# Patient Record
Sex: MALE | Race: OTHER | HISPANIC OR LATINO | URBAN - METROPOLITAN AREA
[De-identification: names, ages, dates, MRNs, and addresses within clinical notes are randomized per-mention and may not be internally consistent; named-entity substitution may affect disease eponyms.]

---

## 2019-09-10 ENCOUNTER — EMERGENCY (EMERGENCY)
Facility: HOSPITAL | Age: 24
LOS: 1 days | Discharge: HOME | End: 2019-09-10
Attending: EMERGENCY MEDICINE | Admitting: EMERGENCY MEDICINE
Payer: COMMERCIAL

## 2019-09-10 VITALS
WEIGHT: 250 LBS | OXYGEN SATURATION: 97 % | HEART RATE: 84 BPM | TEMPERATURE: 98 F | DIASTOLIC BLOOD PRESSURE: 90 MMHG | RESPIRATION RATE: 18 BRPM | SYSTOLIC BLOOD PRESSURE: 146 MMHG

## 2019-09-10 VITALS
HEART RATE: 85 BPM | DIASTOLIC BLOOD PRESSURE: 72 MMHG | SYSTOLIC BLOOD PRESSURE: 122 MMHG | RESPIRATION RATE: 18 BRPM | OXYGEN SATURATION: 95 %

## 2019-09-10 DIAGNOSIS — R10.13 EPIGASTRIC PAIN: ICD-10-CM

## 2019-09-10 DIAGNOSIS — Z90.49 ACQUIRED ABSENCE OF OTHER SPECIFIED PARTS OF DIGESTIVE TRACT: Chronic | ICD-10-CM

## 2019-09-10 DIAGNOSIS — R11.10 VOMITING, UNSPECIFIED: ICD-10-CM

## 2019-09-10 DIAGNOSIS — Z98.84 BARIATRIC SURGERY STATUS: Chronic | ICD-10-CM

## 2019-09-10 DIAGNOSIS — Z90.49 ACQUIRED ABSENCE OF OTHER SPECIFIED PARTS OF DIGESTIVE TRACT: ICD-10-CM

## 2019-09-10 DIAGNOSIS — R10.9 UNSPECIFIED ABDOMINAL PAIN: ICD-10-CM

## 2019-09-10 LAB
ALBUMIN SERPL ELPH-MCNC: 3.5 G/DL — SIGNIFICANT CHANGE UP (ref 3.4–5)
ALP SERPL-CCNC: 48 U/L — SIGNIFICANT CHANGE UP (ref 40–120)
ALT FLD-CCNC: 18 U/L — SIGNIFICANT CHANGE UP (ref 12–42)
ANION GAP SERPL CALC-SCNC: 6 MMOL/L — LOW (ref 9–16)
APTT BLD: 29.9 SEC — SIGNIFICANT CHANGE UP (ref 27.5–36.3)
AST SERPL-CCNC: 20 U/L — SIGNIFICANT CHANGE UP (ref 15–37)
BASOPHILS NFR BLD AUTO: 0.3 % — SIGNIFICANT CHANGE UP (ref 0–2)
BILIRUB SERPL-MCNC: 0.7 MG/DL — SIGNIFICANT CHANGE UP (ref 0.2–1.2)
BUN SERPL-MCNC: 16 MG/DL — SIGNIFICANT CHANGE UP (ref 7–23)
CALCIUM SERPL-MCNC: 8.9 MG/DL — SIGNIFICANT CHANGE UP (ref 8.5–10.5)
CHLORIDE SERPL-SCNC: 106 MMOL/L — SIGNIFICANT CHANGE UP (ref 96–108)
CO2 SERPL-SCNC: 29 MMOL/L — SIGNIFICANT CHANGE UP (ref 22–31)
CREAT SERPL-MCNC: 1 MG/DL — SIGNIFICANT CHANGE UP (ref 0.5–1.3)
EOSINOPHIL NFR BLD AUTO: 0.7 % — SIGNIFICANT CHANGE UP (ref 0–6)
GLUCOSE SERPL-MCNC: 100 MG/DL — HIGH (ref 70–99)
HCT VFR BLD CALC: 48.3 % — SIGNIFICANT CHANGE UP (ref 39–50)
HGB BLD-MCNC: 16.5 G/DL — SIGNIFICANT CHANGE UP (ref 13–17)
IMM GRANULOCYTES NFR BLD AUTO: 0.4 % — SIGNIFICANT CHANGE UP (ref 0–1.5)
INR BLD: 1.1 — SIGNIFICANT CHANGE UP (ref 0.88–1.16)
LIDOCAIN IGE QN: 77 U/L — SIGNIFICANT CHANGE UP (ref 73–393)
LYMPHOCYTES # BLD AUTO: 20.2 % — SIGNIFICANT CHANGE UP (ref 13–44)
MCHC RBC-ENTMCNC: 30.8 PG — SIGNIFICANT CHANGE UP (ref 27–34)
MCHC RBC-ENTMCNC: 34.2 G/DL — SIGNIFICANT CHANGE UP (ref 32–36)
MCV RBC AUTO: 90.3 FL — SIGNIFICANT CHANGE UP (ref 80–100)
MONOCYTES NFR BLD AUTO: 8.3 % — SIGNIFICANT CHANGE UP (ref 2–14)
NEUTROPHILS NFR BLD AUTO: 70.1 % — SIGNIFICANT CHANGE UP (ref 43–77)
PLATELET # BLD AUTO: 236 K/UL — SIGNIFICANT CHANGE UP (ref 150–400)
POTASSIUM SERPL-MCNC: 3.8 MMOL/L — SIGNIFICANT CHANGE UP (ref 3.5–5.3)
POTASSIUM SERPL-SCNC: 3.8 MMOL/L — SIGNIFICANT CHANGE UP (ref 3.5–5.3)
PROT SERPL-MCNC: 7.4 G/DL — SIGNIFICANT CHANGE UP (ref 6.4–8.2)
PROTHROM AB SERPL-ACNC: 12.2 SEC — SIGNIFICANT CHANGE UP (ref 10–12.9)
RBC # BLD: 5.35 M/UL — SIGNIFICANT CHANGE UP (ref 4.2–5.8)
RBC # FLD: 12.6 % — SIGNIFICANT CHANGE UP (ref 10.3–14.5)
SODIUM SERPL-SCNC: 141 MMOL/L — SIGNIFICANT CHANGE UP (ref 132–145)
WBC # BLD: 9.7 K/UL — SIGNIFICANT CHANGE UP (ref 3.8–10.5)
WBC # FLD AUTO: 9.7 K/UL — SIGNIFICANT CHANGE UP (ref 3.8–10.5)

## 2019-09-10 PROCEDURE — 74177 CT ABD & PELVIS W/CONTRAST: CPT | Mod: 26

## 2019-09-10 PROCEDURE — 99284 EMERGENCY DEPT VISIT MOD MDM: CPT

## 2019-09-10 RX ORDER — ONDANSETRON 8 MG/1
4 TABLET, FILM COATED ORAL ONCE
Refills: 0 | Status: COMPLETED | OUTPATIENT
Start: 2019-09-10 | End: 2019-09-10

## 2019-09-10 RX ORDER — SODIUM CHLORIDE 9 MG/ML
1000 INJECTION INTRAMUSCULAR; INTRAVENOUS; SUBCUTANEOUS ONCE
Refills: 0 | Status: COMPLETED | OUTPATIENT
Start: 2019-09-10 | End: 2019-09-10

## 2019-09-10 RX ORDER — IOHEXOL 300 MG/ML
30 INJECTION, SOLUTION INTRAVENOUS ONCE
Refills: 0 | Status: COMPLETED | OUTPATIENT
Start: 2019-09-10 | End: 2019-09-10

## 2019-09-10 RX ORDER — RANITIDINE HYDROCHLORIDE 150 MG/1
1 TABLET, FILM COATED ORAL
Qty: 60 | Refills: 0
Start: 2019-09-10 | End: 2019-10-09

## 2019-09-10 RX ADMIN — SODIUM CHLORIDE 1000 MILLILITER(S): 9 INJECTION INTRAMUSCULAR; INTRAVENOUS; SUBCUTANEOUS at 15:32

## 2019-09-10 RX ADMIN — ONDANSETRON 4 MILLIGRAM(S): 8 TABLET, FILM COATED ORAL at 19:47

## 2019-09-10 RX ADMIN — IOHEXOL 30 MILLILITER(S): 300 INJECTION, SOLUTION INTRAVENOUS at 15:45

## 2019-09-10 RX ADMIN — ONDANSETRON 4 MILLIGRAM(S): 8 TABLET, FILM COATED ORAL at 15:44

## 2019-09-10 NOTE — ED ADULT NURSE NOTE - CHPI ED NUR SYMPTOMS NEG
no burning urination/no fever/no blood in stool/no hematuria/no abdominal distension/no dysuria/no chills

## 2019-09-10 NOTE — ED PROVIDER NOTE - PATIENT PORTAL LINK FT
You can access the FollowMyHealth Patient Portal offered by [facility] by registering at the following website: [email or web address]. By joining [email or web address]’s FollowMyHealth portal, you will also be able to view your health information using other applications (apps) compatible with our system.

## 2019-09-10 NOTE — ED PROVIDER NOTE - CLINICAL SUMMARY MEDICAL DECISION MAKING FREE TEXT BOX
25 y/o well appearing Male with a history of gastric bypass surgery x2 years ago presents with recurrent epigastric pain since last night but worsen with a vomiting episode after drinking water and had loose stool. Pt is pain-free currently with no nausea. No vomiting in the ER. Abdomen soft but given h/o gastric bypass surgery, will obtain laboratory studies and CT abd/pelvic for underlying pathology. 25 y/o well appearing Male with a history of gastric bypass surgery x2 years ago presents with recurrent epigastric pain since last night but worsen with a vomiting episode after drinking water and had loose stool. Pt is pain-free currently with no nausea. No vomiting in the ER. Abdomen soft but given h/o gastric bypass surgery, will obtain laboratory studies and CT abd/pelvic for underlying pathology.    Ct negative. patient tolerating po, symptoms improved. Discharge to home, f/u with surgeon and take Zantac.

## 2019-09-10 NOTE — ED ADULT NURSE NOTE - OBJECTIVE STATEMENT
Pt A&Ox4 c/o epigastric and diffuse abdominal pain in 3/4 quadrants, excluding the RUQ. Pt reports referred pain to L shoulder, denies back pain. Pt reports nausea and vomiting last night into this morning, with one episode of diarrhea today. Pt reports vomitus is yellow and white in color. Surgical hx of gastric bypass in 2017, appendectomy about 9 years ago.

## 2019-09-10 NOTE — ED PROVIDER NOTE - NSFOLLOWUPINSTRUCTIONS_ED_ALL_ED_FT
F/u with your physician/surgeon who performed your gastric bypass  Take meds as prescribed      Gastritis, Adult  Gastritis is swelling (inflammation) of the stomach. When you have this condition, you can have these problems (symptoms):  Pain in your stomach.  A burning feeling in your stomach.  Feeling sick to your stomach (nauseous).  Throwing up (vomiting).  Feeling too full after you eat.  It is important to get help for this condition. If you do not get help, your stomach can bleed, and you can get sores (ulcers) in your stomach.    Follow these instructions at home:  Image   Take over-the-counter and prescription medicines only as told by your doctor.  If you were prescribed an antibiotic medicine, take it as told by your doctor. Do not stop taking it even if you start to feel better.  Drink enough fluid to keep your pee (urine) pale yellow.  Instead of eating big meals, eat small meals often.  Avoid foods and drinks that make your symptoms worse.  Contact a doctor if:  Your problems get worse.  Your problems go away and then come back.  Get help right away if:  You throw up blood or something that looks like coffee grounds.  You have black or dark red poop (stools).  You throw up any time you try to take a drink.  Your stomach pain gets worse.  You have a fever.  You do not feel better after 1 week.  Summary  Gastritis is swelling (inflammation) of the stomach.  It is important to get help for this condition. If you do not get help, your stomach can bleed, and you can get sores (ulcers) in your stomach.  Take over-the-counter and prescription medicines only as told by your doctor.  This information is not intended to replace advice given to you by your health care provider. Make sure you discuss any questions you have with your health care provider.    Document Released: 06/05/2009 Document Revised: 07/31/2018 Document Reviewed: 09/10/2016

## 2019-09-10 NOTE — ED ADULT TRIAGE NOTE - CHIEF COMPLAINT QUOTE
Pt presents to ED c/o mid epigastric pain with N/V, unable to maintain PO intake since yesterday. Hx of gastric bypass surgery x1 year ago. Denies any fever/chills, no CP/SOB.

## 2019-09-10 NOTE — ED PROVIDER NOTE - OBJECTIVE STATEMENT
23 y/o Male with no PMHx, PSHx of Gastric bypass surgery x2 year ago and Appendectomy, presents to the ED c/o epigastric pain since last night. States he has history of similar intermittent episodes and last episode was x3 weeks ago but had never went to see a doctor. Reports the pain is worse with eating and palpations, and it feels like "everything is moving". In the morning, he drank water and ended up vomiting. Around 12 pm,  he had a diarrhea-like BM. No changes in urinary function. Reports he had the bypass surgery at [facility] and no longer do follow-ups and had lost x120 lbs. No complications. Denies h/o DM but has not had check-ups with a PCP. Reports no nausea currently. Denies back pain, fever, chills, melena, hematochezia, change in urinary/bowel function, flank pain, HA, dizziness, focal weakness, CP, SOB, palpitations, cough, and malaise.

## 2019-09-10 NOTE — ED PROVIDER NOTE - CHPI ED SYMPTOMS NEG
no fever, chills, melena, hematochezia, change in urinary/bowel function, flank pain, HA, dizziness, focal weakness, CP, SOB, palpitations, cough, and malaise.

## 2021-12-07 NOTE — ED ADULT NURSE NOTE - NSFALLRSKPASTHIST_ED_ALL_ED
Medicine Refill Request    Last Office Visit: 10/5/2021  Last Telemedicine Visit: Visit date not found    Next Office Visit: Visit date not found  Next Telemedicine Visit: Visit date not found         Current Outpatient Medications:   •  cholecalciferol, vitamin D3, (cholecalciferol) 1,000 unit tablet, take 2 tablets daily, Disp: , Rfl:   •  clonazePAM (KlonoPIN) 0.5 mg tablet, Take 1 tablet (0.5 mg total) by mouth nightly as needed for anxiety., Disp: 30 tablet, Rfl: 0  •  fosinopriL (MONOPRIL) 40 mg tablet, TAKE 1 TABLET BY MOUTH EVERY DAY, Disp: 90 tablet, Rfl: 3  •  hydrochlorothiazide (HYDRODIURIL) 25 mg tablet, TAKE 1 TABLET BY MOUTH EVERY DAY, Disp: 90 tablet, Rfl: 3  •  metFORMIN 1,000 mg tablet, TAKE 1 TABLET BY MOUTH TWICE A DAY WITH MEALS, Disp: 180 tablet, Rfl: 0  •  metoprolol succinate XL 25 mg 24 hr tablet, TAKE 1 TABLET BY MOUTH EVERY DAY, Disp: 90 tablet, Rfl: 0  •  omega-3 fatty acids (FISH OIL CONCENTRATE) 1,000 mg capsule, 2 tablet daily, Disp: , Rfl:   •  simvastatin (ZOCOR) 40 mg tablet, TAKE 1 TABLET BY MOUTH EVERY DAY AT NIGHT, Disp: 90 tablet, Rfl: 3      BP Readings from Last 3 Encounters:   11/09/21 (!) 175/83   10/26/21 (!) 175/83   10/05/21 120/72       Recent Lab results:  Lab Results   Component Value Date    CHOL 131 10/05/2021   ,   Lab Results   Component Value Date    HDL 41 10/05/2021   ,   Lab Results   Component Value Date    LDLCALC 51 10/05/2021   ,   Lab Results   Component Value Date    TRIG 251 (H) 10/05/2021        Lab Results   Component Value Date    GLUCOSE 151 (H) 11/09/2021   ,   Lab Results   Component Value Date    HGBA1C 7.1 (H) 10/05/2021         Lab Results   Component Value Date    CREATININE 1.4 (H) 11/09/2021       Lab Results   Component Value Date    TSH 3.360 07/16/2016            no

## 2022-09-06 NOTE — ED ADULT NURSE NOTE - NSSEPSISSUSPECTED_ED_A_ED
Duplicate.  rx' sent today;  E-Prescribing Status: Receipt confirmed by pharmacy (9/6/2022  3:18 PM CDT)   No